# Patient Record
Sex: FEMALE | URBAN - METROPOLITAN AREA
[De-identification: names, ages, dates, MRNs, and addresses within clinical notes are randomized per-mention and may not be internally consistent; named-entity substitution may affect disease eponyms.]

---

## 2020-01-15 ENCOUNTER — APPOINTMENT (OUTPATIENT)
Age: 75
Setting detail: DERMATOLOGY
End: 2020-01-15

## 2020-01-15 DIAGNOSIS — Z41.9 ENCOUNTER FOR PROCEDURE FOR PURPOSES OTHER THAN REMEDYING HEALTH STATE, UNSPECIFIED: ICD-10-CM

## 2020-01-15 PROCEDURE — OTHER PATIENT SPECIFIC COUNSELING: OTHER

## 2020-01-15 NOTE — PROCEDURE: PATIENT SPECIFIC COUNSELING
Discussed bellafill vs HA fillers for liquid face lift, and dysport for brow lift.
Detail Level: Generalized